# Patient Record
Sex: FEMALE | Race: BLACK OR AFRICAN AMERICAN | NOT HISPANIC OR LATINO | URBAN - METROPOLITAN AREA
[De-identification: names, ages, dates, MRNs, and addresses within clinical notes are randomized per-mention and may not be internally consistent; named-entity substitution may affect disease eponyms.]

---

## 2020-11-25 ENCOUNTER — EMERGENCY (EMERGENCY)
Facility: HOSPITAL | Age: 45
LOS: 1 days | Discharge: HOME | End: 2020-11-25
Attending: EMERGENCY MEDICINE | Admitting: EMERGENCY MEDICINE
Payer: MEDICAID

## 2020-11-25 VITALS
OXYGEN SATURATION: 100 % | TEMPERATURE: 97 F | RESPIRATION RATE: 15 BRPM | SYSTOLIC BLOOD PRESSURE: 132 MMHG | DIASTOLIC BLOOD PRESSURE: 93 MMHG | HEART RATE: 93 BPM

## 2020-11-25 PROCEDURE — 99283 EMERGENCY DEPT VISIT LOW MDM: CPT

## 2020-11-25 RX ORDER — IBUPROFEN 200 MG
1 TABLET ORAL
Qty: 15 | Refills: 0
Start: 2020-11-25 | End: 2020-11-29

## 2020-11-25 NOTE — ED POST DISCHARGE NOTE - RESULT SUMMARY
RAUL Jackson: [facility] pharmacy called stating they did not receive the pts rxs. RX for Augmentin and Ibuprofen noted in the system, however "failed". Tried to electronically resubmit, however did not submit. Telephone rx for Augmentin 875/125 1 tab bid x 10 days and ibuprofen 600mg every 8 hours as needed for pain #15 tabs ordered.

## 2020-11-25 NOTE — PROGRESS NOTE ADULT - SUBJECTIVE AND OBJECTIVE BOX
Patient is a 45y old  Female who presents with a chief complaint of swelling and tooth pain.    HPI: Patient states swelling started ~1 day ago. Pain level 10/10      PAST MEDICAL & SURGICAL HISTORY:  No pertinent past medical history    No significant past surgical history    Allergies    No Known Allergies    Intolerances      Vital Signs Last 24 Hrs  T(C): 36.3 (25 Nov 2020 14:13), Max: 36.3 (25 Nov 2020 14:13)  T(F): 97.4 (25 Nov 2020 14:13), Max: 97.4 (25 Nov 2020 14:13)  HR: 93 (25 Nov 2020 14:13) (93 - 93)  BP: 132/93 (25 Nov 2020 14:13) (132/93 - 132/93)  BP(mean): --  RR: 15 (25 Nov 2020 14:13) (15 - 15)  SpO2: 100% (25 Nov 2020 14:13) (100% - 100%)    EOE:  TMJ ( -  ) clicks                  ( -   ) pops                  ( -  ) crepitus             Mandible FROM             Facial bones and MOM grossly intact             ( -  ) trismus             ( -  ) LAD             ( +  ) swelling, swelling noted on R side of face             ( +  ) asymmetry             ( +  ) palpation             ( -  ) SOB             ( -  ) dysphagia             ( -  ) LOC    IOE:  permanent dentition: multiple carious teeth           hard/soft palate:  (  - ) palatal torus           tongue/FOM WNL           labial/buccal mucosa WNL           ( +  ) percussion           ( +  ) palpation, around tooth #2           ( + ) swelling, no fluctuant swelling intraorally        *DENTAL RADIOGRAPHS: 1 PAN, 1PA obtained. Double apron used to capture radiograph due to possible pregnancy. #2 grossly decayed to bone level.    ASSESSMENT: Tooth #2 grossly decayed.    PROCEDURE: Limited exam completed with radiographs. No fluctuant swelling intraorally, I&D not indicated.  Verbal <<and written>> consent given.     RECOMMENDATIONS:  1) Antibiotics as per med team.  2) Dental F/U with outpatient dentist for comprehensive dental care or [facility] for emergency treatment [phone]  3) If any difficulty swallowing/breathing, fever occur, page dental.     Mery Mensah, DMD #[id number]

## 2020-11-25 NOTE — ED PROVIDER NOTE - NSFOLLOWUPINSTRUCTIONS_ED_ALL_ED_FT
Advance activity as tolerated.  Continue all previously prescribed medications as directed unless otherwise instructed.  Follow up with your primary care physician in 48-72 hours- bring copies of your results.  Return to the ER for worsening or persistent symptoms, and/or ANY NEW OR CONCERNING SYMPTOMS. If you have issues obtaining follow up, please call: [phone] to obtain a doctor or specialist who takes your insurance in your area.  You may call [phone] to make an appointment with the internal medicine clinic.

## 2020-11-25 NOTE — ED PROVIDER NOTE - PATIENT PORTAL LINK FT
You can access the FollowMyHealth Patient Portal offered by [facility] by registering at the following website: [email or web address]. By joining [facility]’s FollowMyHealth portal, you will also be able to view your health information using other applications (apps) compatible with our system.

## 2020-11-25 NOTE — ED PROVIDER NOTE - OBJECTIVE STATEMENT
44 y/o F w/ no PMH p/w toothache/cheek swelling x1day. Denies any prior dental infection, fever, chills, chest pain, shortness of breath, cough or difficulty swallowing. Pt had expressed concern about her pregnancy status. States she had a questionable positive test in october but then had 7 days of bleeding as per usual period in early november. No bleeding after that. UCG performed before evaluation was negative.

## 2020-11-25 NOTE — ED PROVIDER NOTE - MOUTH
+Tenderness overlying area of L upper molars, tooth #30 approximately. Inside of mouth w/ no visual lesions. No gingival swelling. No dental fractures. +Tenderness overlying area of R upper molars, tooth #2 approximately. Inside of mouth w/ no visual lesions. No gingival swelling. No dental fractures.

## 2020-11-25 NOTE — ED ADULT TRIAGE NOTE - CHIEF COMPLAINT QUOTE
Pt presents to ED for abscess to right lower jaw since yesterday morning. Right cheek observed to be swollen at this time. Denies any drainage/ bleeding to abscess. Pt also believes she is pregnant, LMP 10/3/2020- requesting to have blood test to check for pregnancy, states she had a positive at home pregnancy test. C/o lower abd pain, denies dysuria.

## 2020-11-25 NOTE — ED PROVIDER NOTE - CLINICAL SUMMARY MEDICAL DECISION MAKING FREE TEXT BOX
44 y/o F w/ questionable dental infection. Consulted w/ dental. Pt given follow up information for PCP/ob-gyn.

## 2020-11-25 NOTE — ED PROVIDER NOTE - ATTENDING CONTRIBUTION TO CARE
agree with PA note    "44 y/o F w/ no PMH p/w toothache/cheek swelling x1day. Denies any prior dental infection, fever, chills, chest pain, shortness of breath, cough or difficulty swallowing. Pt had expressed concern about her pregnancy status. States she had a questionable positive test in october but then had 7 days of bleeding as per usual period in early november. No bleeding after that. UCG performed before evaluation was negative."    PE: well appearing; no facial swelling or trismus; voice clear; periodontitis of right upper molar tooth; no abscess; CTAB/L; s1 s2 no m/r/g    Imp: periodontitis; will have dental see but likely abx and pain control

## 2020-11-25 NOTE — ED PROVIDER NOTE - NSFOLLOWUPCLINICS_GEN_ALL_ED_FT
[facility]  OB/GYN & Surg  [address] [place] [id number]  Phone: [phone]  Fax:   Follow Up Time:     Oral & Maxillofacial Surgery  Department of Dental Medicine  [address] [place] [id number]  Phone: [phone]  Fax: [phone]  Follow Up Time: